# Patient Record
Sex: MALE | Race: WHITE | NOT HISPANIC OR LATINO | URBAN - METROPOLITAN AREA
[De-identification: names, ages, dates, MRNs, and addresses within clinical notes are randomized per-mention and may not be internally consistent; named-entity substitution may affect disease eponyms.]

---

## 2020-10-21 ENCOUNTER — APPOINTMENT (RX ONLY)
Dept: URBAN - METROPOLITAN AREA CLINIC 347 | Facility: CLINIC | Age: 45
Setting detail: DERMATOLOGY
End: 2020-10-21

## 2020-10-21 DIAGNOSIS — L72.0 EPIDERMAL CYST: ICD-10-CM

## 2020-10-21 PROCEDURE — ? INCISION AND DRAINAGE

## 2020-10-21 PROCEDURE — ? COUNSELING

## 2020-10-21 PROCEDURE — ? PRESCRIPTION

## 2020-10-21 PROCEDURE — ? TREATMENT REGIMEN

## 2020-10-21 PROCEDURE — 10060 I&D ABSCESS SIMPLE/SINGLE: CPT

## 2020-10-21 RX ORDER — DOXYCYCLINE HYCLATE 100 MG/1
TABLET, COATED ORAL
Qty: 7 | Refills: 0 | Status: ERX | COMMUNITY
Start: 2020-10-21

## 2020-10-21 RX ADMIN — DOXYCYCLINE HYCLATE: 100 TABLET, COATED ORAL at 00:00

## 2020-10-21 ASSESSMENT — LOCATION ZONE DERM: LOCATION ZONE: TRUNK

## 2020-10-21 ASSESSMENT — LOCATION SIMPLE DESCRIPTION DERM: LOCATION SIMPLE: LEFT UPPER BACK

## 2020-10-21 ASSESSMENT — LOCATION DETAILED DESCRIPTION DERM: LOCATION DETAILED: LEFT SUPERIOR UPPER BACK

## 2020-10-21 NOTE — PROCEDURE: INCISION AND DRAINAGE
Detail Level: Detailed
Epidermal Sutures: 4-0 Ethilon
Size Of Lesion In Cm (Optional But May Be Required For Some Insurances): 0
Wound Care: Aquaphor
Curette: No
Epidermal Closure: simple interrupted
Render Postcare In Note?: Yes
Consent was obtained and risks were reviewed including but not limited to delayed wound healing, infection, need for multiple I and D's, and pain.
Drainage Type?: cyst-like
Method: 11 blade
Post-Care Instructions: I reviewed with the patient in detail post-care instructions. Patient should keep wound covered and call the office should any redness, pain, swelling or worsening occur.
Anesthesia Volume In Cc: 1
Curette Text (Optional): After the contents were expressed a curette was used to partially remove the cyst wall.
Dressing: dry sterile dressing
Preparation Text: The area was prepped in the usual clean fashion.
Suture Text: The incision was partially closed with
Drainage Amount?: minimal
Lesion Type: Cyst

## 2023-07-31 ENCOUNTER — APPOINTMENT (RX ONLY)
Dept: URBAN - METROPOLITAN AREA CLINIC 340 | Facility: CLINIC | Age: 48
Setting detail: DERMATOLOGY
End: 2023-07-31

## 2023-07-31 DIAGNOSIS — M71 OTHER BURSOPATHIES: ICD-10-CM | Status: STABLE

## 2023-07-31 PROBLEM — M71.341 OTHER BURSAL CYST, RIGHT HAND: Status: ACTIVE | Noted: 2023-07-31

## 2023-07-31 PROCEDURE — 99212 OFFICE O/P EST SF 10 MIN: CPT

## 2023-07-31 PROCEDURE — ? COUNSELING

## 2023-07-31 PROCEDURE — ? REFERRAL

## 2023-07-31 ASSESSMENT — LOCATION SIMPLE DESCRIPTION DERM: LOCATION SIMPLE: RIGHT SMALL FINGER

## 2023-07-31 ASSESSMENT — LOCATION DETAILED DESCRIPTION DERM: LOCATION DETAILED: RIGHT SMALL DISTAL INTERPHALANGEAL JOINT

## 2023-07-31 ASSESSMENT — LOCATION ZONE DERM: LOCATION ZONE: FINGER
